# Patient Record
Sex: MALE | Race: BLACK OR AFRICAN AMERICAN | NOT HISPANIC OR LATINO | Employment: FULL TIME | URBAN - METROPOLITAN AREA
[De-identification: names, ages, dates, MRNs, and addresses within clinical notes are randomized per-mention and may not be internally consistent; named-entity substitution may affect disease eponyms.]

---

## 2023-05-03 ENCOUNTER — HOSPITAL ENCOUNTER (EMERGENCY)
Facility: HOSPITAL | Age: 23
Discharge: HOME | End: 2023-05-03

## 2023-05-03 VITALS
HEIGHT: 70 IN | RESPIRATION RATE: 18 BRPM | WEIGHT: 145 LBS | TEMPERATURE: 98 F | OXYGEN SATURATION: 98 % | SYSTOLIC BLOOD PRESSURE: 130 MMHG | DIASTOLIC BLOOD PRESSURE: 96 MMHG | HEART RATE: 72 BPM | BODY MASS INDEX: 20.76 KG/M2

## 2023-05-03 DIAGNOSIS — H60.01 ABSCESS, EARLOBE, RIGHT: ICD-10-CM

## 2023-05-03 DIAGNOSIS — K04.7 DENTAL ABSCESS: Primary | ICD-10-CM

## 2023-05-03 PROCEDURE — 99284 EMERGENCY DEPT VISIT MOD MDM: CPT

## 2023-05-03 PROCEDURE — 87389 HIV-1 AG W/HIV-1&-2 AB AG IA: CPT | Performed by: EMERGENCY MEDICINE

## 2023-05-03 PROCEDURE — 86803 HEPATITIS C AB TEST: CPT | Performed by: EMERGENCY MEDICINE

## 2023-05-03 RX ORDER — AMOXICILLIN 875 MG/1
875 TABLET, FILM COATED ORAL 2 TIMES DAILY
Qty: 14 TABLET | Refills: 0 | Status: SHIPPED | OUTPATIENT
Start: 2023-05-03

## 2023-05-03 RX ORDER — IBUPROFEN 800 MG/1
800 TABLET ORAL EVERY 6 HOURS PRN
Qty: 20 TABLET | Refills: 0 | Status: SHIPPED | OUTPATIENT
Start: 2023-05-03

## 2023-05-03 RX ORDER — LIDOCAINE HYDROCHLORIDE 20 MG/ML
SOLUTION OROPHARYNGEAL
Qty: 100 ML | Refills: 0 | Status: SHIPPED | OUTPATIENT
Start: 2023-05-03

## 2023-05-03 NOTE — ED PROVIDER NOTES
Encounter Date: 5/3/2023       History     Chief Complaint   Patient presents with    Dental Pain     Right upper and lower jaw pain x 1 week     Patient is a 22-year-old male presents emergency room with right lower jaw pain x1 week, progressively gotten worse over the past 48 hours.  Patient takes took 2 Aleve earlier today has not helped any.  Patient also states he had an abscess on his earlobe that his girlfriend has squeezed and mashed impression the drainage out yesterday.  Patient denies having any fevers, chills, nausea, vomiting, diarrhea, chest pain shortness of breath.  Patient states he has had dental problems in the past, and his mother is trying to get him set up with a dentist within the next 7 days.    Review of patient's allergies indicates:  No Known Allergies  History reviewed. No pertinent past medical history.  No past surgical history on file.  History reviewed. No pertinent family history.     Review of Systems   Constitutional: Negative.    HENT:  Positive for dental problem.    Eyes: Negative.    Respiratory: Negative.     Cardiovascular: Negative.    Gastrointestinal: Negative.    Endocrine: Negative.    Genitourinary: Negative.    Musculoskeletal: Negative.    Skin:         Abscess right earlobe   Allergic/Immunologic: Negative for food allergies.   Neurological: Negative.    Hematological: Negative.    Psychiatric/Behavioral: Negative.     All other systems reviewed and are negative.    Physical Exam     Initial Vitals [05/03/23 1757]   BP Pulse Resp Temp SpO2   (!) 134/97 73 16 98.3 °F (36.8 °C) 98 %      MAP       --         Physical Exam    Nursing note and vitals reviewed.  Constitutional: He appears well-developed and well-nourished. He is not diaphoretic. No distress.   HENT:   Head: Atraumatic.   Mouth/Throat: Oropharynx is clear and moist.   Eyes: Conjunctivae and EOM are normal. Pupils are equal, round, and reactive to light. No scleral icterus.   Neck:   Normal range of  motion.  Cardiovascular:  Normal rate and intact distal pulses.           No murmur heard.  Pulmonary/Chest: No respiratory distress. He has no wheezes. He has no rhonchi. He has no rales.   Abdominal: Abdomen is soft. Bowel sounds are normal.   Musculoskeletal:         General: No edema. Normal range of motion.      Cervical back: Normal range of motion.     Lymphadenopathy:     He has no cervical adenopathy.   Neurological: He is alert and oriented to person, place, and time. He has normal strength. No sensory deficit. GCS score is 15. GCS eye subscore is 4. GCS verbal subscore is 5. GCS motor subscore is 6.   Skin: Skin is warm and dry. Capillary refill takes 2 to 3 seconds. Abscess (healing abscess to the right earlobe) noted.   Psychiatric: He has a normal mood and affect.       ED Course   Procedures  Labs Reviewed   HIV 1 / 2 ANTIBODY   HEPATITIS C ANTIBODY          Imaging Results    None          Medications - No data to display  Medical Decision Making:   Initial Assessment:   Patient was seen examined in emergency room, no acute distress noted at this time.  Patient does have some tenderness to the right mastoid, right earlobe.  There is no obvious signs of swelling or abscess to the posterior dentation right lower jaw, however patient does appear to have gingivitis as well as crowding secondary to wisdom teeth.  Other detailed assessment as noted above.  Differential Diagnosis:   Skin Abscess, dental abscess, dental caries, gingivitis, fractured tooth, nerve damage  ED Management:  After patient was seen examined emergency room, place patient on amoxicillin for 7 days.  Will give the patient 100 milligrams ibuprofen for pain.  The as well as viscous lidocaine.  Advised patient to use viscous lidocaine every 3 hours as needed for dental pain.  Follow-up with dentist within the next 7 days possible tooth extraction.  Patient verbalized understanding treatment plan.                        Clinical  Impression:   Final diagnoses:  [K04.7] Dental abscess (Primary)  [H60.01] Abscess, earlobe, right        ED Disposition Condition    Discharge Stable          ED Prescriptions       Medication Sig Dispense Start Date End Date Auth. Provider    amoxicillin (AMOXIL) 875 MG tablet Take 1 tablet (875 mg total) by mouth 2 (two) times daily. 14 tablet 5/3/2023 -- Omari Galicia NP    ibuprofen (ADVIL,MOTRIN) 800 MG tablet Take 1 tablet (800 mg total) by mouth every 6 (six) hours as needed for Pain. 20 tablet 5/3/2023 -- Omari Galicia NP    LIDOcaine HCl 2% (LIDOCAINE VISCOUS) 2 % Soln by Mucous Membrane route every 3 (three) hours. As needed for dental pain, do not swallow 100 mL 5/3/2023 -- Omari Galicia NP          Follow-up Information    None          Omari Galicia NP  05/03/23 7310

## 2023-05-03 NOTE — DISCHARGE INSTRUCTIONS
Take antibiotics as prescribed, be sure to finish antibiotics.    Use viscous lidocaine on a cotton ball over dentation with most pain, do not swallow this is lidocaine.  Use Motrin sparingly for severe pain.    May also alternate with Tylenol 1000 milligrams every 8 hours.    Follow-up with dentistry in the next 7 days.  Follow-up primary care provider for any further concerns, return emergency room if you develop any new worsening conditions.

## 2023-05-04 LAB
HCV AB SERPL QL IA: NORMAL
HIV 1+2 AB+HIV1 P24 AG SERPL QL IA: NORMAL